# Patient Record
Sex: MALE | Race: WHITE | Employment: OTHER | URBAN - METROPOLITAN AREA
[De-identification: names, ages, dates, MRNs, and addresses within clinical notes are randomized per-mention and may not be internally consistent; named-entity substitution may affect disease eponyms.]

---

## 2019-03-05 ENCOUNTER — HOSPITAL ENCOUNTER (EMERGENCY)
Age: 59
Discharge: HOME | End: 2019-03-05
Attending: EMERGENCY MEDICINE
Payer: COMMERCIAL

## 2019-03-05 ENCOUNTER — APPOINTMENT (OUTPATIENT)
Dept: VASCULAR SURGERY | Age: 59
End: 2019-03-05
Attending: PHYSICIAN ASSISTANT
Payer: COMMERCIAL

## 2019-03-05 VITALS
DIASTOLIC BLOOD PRESSURE: 88 MMHG | SYSTOLIC BLOOD PRESSURE: 141 MMHG | RESPIRATION RATE: 20 BRPM | HEIGHT: 71 IN | TEMPERATURE: 98.3 F | OXYGEN SATURATION: 100 % | HEART RATE: 70 BPM | WEIGHT: 182 LBS | BODY MASS INDEX: 25.48 KG/M2

## 2019-03-05 DIAGNOSIS — M79.661 RIGHT CALF PAIN: Primary | ICD-10-CM

## 2019-03-05 DIAGNOSIS — S86.001A ACHILLES TENDON INJURY, RIGHT, INITIAL ENCOUNTER: ICD-10-CM

## 2019-03-05 PROCEDURE — 99283 EMERGENCY DEPT VISIT LOW MDM: CPT

## 2019-03-05 PROCEDURE — 93971 EXTREMITY STUDY: CPT

## 2019-03-05 RX ORDER — AMITRIPTYLINE HYDROCHLORIDE 10 MG/1
TABLET, FILM COATED ORAL
COMMUNITY

## 2019-03-05 NOTE — ED PROVIDER NOTES
EMERGENCY DEPARTMENT HISTORY AND PHYSICAL EXAM    Date: 3/5/2019  Patient Name: Nelly Phoenix    History of Presenting Illness     Chief Complaint   Patient presents with    Leg Pain         History Provided By: Patient    Chief Complaint: RLE pain  Duration: 2 Days  Timing:  Progressive  Location: right calf  Severity: Mild  Associated Symptoms: swelling around achilles tendon    Additional History (Context):   11:13 AM  Nelly Phoenix is a 59 y.o. male with PMHX of achilles tendon tear (1 week ago) who presents to the emergency department C/O progressive right calf pain, onset 2 days ago. Associated sxs include swelling around achilles tendon. Pt tore his right achilles tendon 7 days ago while he was playing pickle ball and states he heard a \"ripping sound\". Pt was seen by Dr. Vazquez Adjutant 6 days ago for RLE pain. Pt received MRI 5 days ago and was told he will need another MRI taken higher up in the leg. Right ankle pain radiated to right calf 2 days ago. Pt called his podiatrist's office and the on-call physician advised pt to be evaluate in the ED to r/o DVT. Pt denies any other sxs or complaints. PCP: Sujatha Ryan MD    Current Outpatient Medications   Medication Sig Dispense Refill    amitriptyline (ELAVIL) 10 mg tablet Take  by mouth nightly. Past History     Past Medical History:  History reviewed. No pertinent past medical history. Past Surgical History:  History reviewed. No pertinent surgical history. Family History:  History reviewed. No pertinent family history. Social History:  Social History     Tobacco Use    Smoking status: Never Smoker    Smokeless tobacco: Never Used   Substance Use Topics    Alcohol use: Yes    Drug use: Not on file       Allergies: Allergies   Allergen Reactions    Tylenol [Acetaminophen] Hives         Review of Systems   Review of Systems   Respiratory: Negative for shortness of breath. Cardiovascular: Positive for leg swelling.  Negative for chest pain and palpitations. Musculoskeletal: Positive for myalgias. (+) swelling around achilles tendon   Neurological: Negative for weakness and numbness. Hematological: Negative for adenopathy. All other systems reviewed and are negative. Physical Exam     Vitals:    03/05/19 1055   BP: 141/88   Pulse: 70   Resp: 20   Temp: 98.3 °F (36.8 °C)   SpO2: 100%   Weight: 82.6 kg (182 lb)   Height: 5' 11\" (1.803 m)     Physical Exam   Constitutional: He is oriented to person, place, and time. He appears well-developed and well-nourished. No distress.  male in NAD. Alert. Appears comfortable. HENT:   Head: Normocephalic and atraumatic. Right Ear: External ear normal.   Left Ear: External ear normal.   Nose: Nose normal.   Eyes: Conjunctivae are normal.   Neck: Normal range of motion. Cardiovascular: Normal rate, regular rhythm, normal heart sounds and intact distal pulses. Exam reveals no gallop and no friction rub. No murmur heard. Pulses:       Dorsalis pedis pulses are 2+ on the right side, and 2+ on the left side. Posterior tibial pulses are 2+ on the right side, and 2+ on the left side. Pulmonary/Chest: Effort normal and breath sounds normal. No accessory muscle usage. No tachypnea. No respiratory distress. He has no decreased breath sounds. He has no wheezes. He has no rhonchi. He has no rales. Musculoskeletal: He exhibits no edema. Right ankle: He exhibits swelling. He exhibits normal range of motion. Achilles tendon exhibits pain. Achilles tendon exhibits no defect. Right lower leg: He exhibits tenderness. He exhibits no swelling and no edema. Left lower leg: He exhibits no tenderness, no bony tenderness and no swelling. Legs:       Right foot: There is normal range of motion, no tenderness and no bony tenderness. Left foot: There is normal range of motion, no tenderness and no bony tenderness.    Neurological: He is alert and oriented to person, place, and time. Skin: Skin is warm and dry. He is not diaphoretic. Psychiatric: He has a normal mood and affect. Judgment normal.   Nursing note and vitals reviewed. Diagnostic Study Results     Labs -   No results found for this or any previous visit (from the past 12 hour(s)). Radiologic Studies -   No orders to display     CT Results  (Last 48 hours)    None        CXR Results  (Last 48 hours)    None        Duplex Lower Extremities:   Right Lower Venous     No evidence of deep vein thrombosis in the right lower extremity. The common femoral, profunda femoral, femoral, popliteal, posterior tibial, peroneal and saphenous femoral junction vein(s) were imaged in the transverse and longitudinal planes. The vessels showed normal color filling and compressibility. Doppler interrogation of the veins showed phasic and spontaneous flow. Left Lower Venous     The common femoral vein(s) were imaged in the transverse and longitudinal planes. The vessels showed normal color filling and compressibility. Doppler interrogation of the veins showed phasic and spontaneous flow. Medications given in the ED-  Medications - No data to display      Medical Decision Making   I am the first provider for this patient. I reviewed the vital signs, available nursing notes, past medical history, past surgical history, family history and social history. Vital Signs-Reviewed the patient's vital signs. Pulse Oximetry Analysis - 100% on room air     Cardiac Monitor:  Rate: 70 bpm  Rhythm: NSR    Records Reviewed: Nursing Notes and Old Medical Records    Provider Notes (Medical Decision Making): DVT, tendonitis, achilles rupture    Procedures:  Procedures    ED Course:   11:13 AM Initial assessment performed. The patients presenting problems have been discussed, and they are in agreement with the care plan formulated and outlined with them.   I have encouraged them to ask questions as they arise throughout their visit. Diagnosis and Disposition     Duplex neg. NVI. No evidence of infectious process. Awaiting FU with Dr. Saloni Bkaer for achilles tendon injury. Has crutches. Reasons to RTED discussed with pt. All questions answered. Pt feels comfortable going home at this time. Pt expressed understanding and he agrees with plan. DISCHARGE NOTE:  1:12 PM  Bharath Mcleod's  results have been reviewed with him. He has been counseled regarding his diagnosis, treatment, and plan. He verbally conveys understanding and agreement of the signs, symptoms, diagnosis, treatment and prognosis and additionally agrees to follow up as discussed. He also agrees with the care-plan and conveys that all of his questions have been answered. I have also provided discharge instructions for him that include: educational information regarding their diagnosis and treatment, and list of reasons why they would want to return to the ED prior to their follow-up appointment, should his condition change. He has been provided with education for proper emergency department utilization. CLINICAL IMPRESSION:    1. Right calf pain    2. Achilles tendon injury, right, initial encounter        PLAN:  1. D/C Home  2. Discharge Medication List as of 3/5/2019  1:13 PM        3. Follow-up Information     Follow up With Specialties Details Why Contact Info    Joann Rsos DPM Podiatry Schedule an appointment as soon as possible for a visit in 2 days For follow up with podiatry [address] [facility] [id number] [place] [id number] [address]      [facility] EMERGENCY DEPT Emergency Medicine Go to As needed, if symptoms worsen [address] [id number]  [phone]        _______________________________    Attestations: This note is prepared by Karlene Bruce, acting as Scribe for [employer].     [employer]:  The scribe's documentation has been prepared under my direction and personally reviewed by me in its entirety.   I confirm that the note above accurately reflects all work, treatment, procedures, and medical decision making performed by me.  _______________________________

## 2019-03-05 NOTE — ED TRIAGE NOTES
Patient states that he injured his right achilles tendon last Wednesday. Patient with complaints of right calf pain for the past 2 days .  Patient sent here to rule out DVT

## 2019-03-05 NOTE — LETTER
[facility] [place] 
[facility] EMERGENCY DEPT 
[address] [id number] 
[phone] Work/School Note Date: 3/5/2019 To Whom It May concern: 
 
Marcelina Zuluaga was seen and treated today in the emergency room by the following provider(s): 
Attending Provider: Keven Blizzard, MD 
Physician Assistant: Amanda Pearl PA-C. Marcelina Zuluaga may return to school on 3/6/2019. Sincerely, Shawn Roberto PA-C